# Patient Record
Sex: FEMALE | Race: OTHER | Employment: FULL TIME | URBAN - METROPOLITAN AREA
[De-identification: names, ages, dates, MRNs, and addresses within clinical notes are randomized per-mention and may not be internally consistent; named-entity substitution may affect disease eponyms.]

---

## 2017-07-03 ENCOUNTER — OFFICE VISIT (OUTPATIENT)
Dept: FAMILY MEDICINE CLINIC | Facility: CLINIC | Age: 52
End: 2017-07-03

## 2017-07-03 VITALS
BODY MASS INDEX: 32.02 KG/M2 | HEART RATE: 74 BPM | DIASTOLIC BLOOD PRESSURE: 82 MMHG | WEIGHT: 174 LBS | HEIGHT: 62 IN | SYSTOLIC BLOOD PRESSURE: 115 MMHG | TEMPERATURE: 98 F

## 2017-07-03 DIAGNOSIS — N76.0 ACUTE VAGINITIS: Primary | ICD-10-CM

## 2017-07-03 DIAGNOSIS — Z12.39 BREAST CANCER SCREENING: ICD-10-CM

## 2017-07-03 PROCEDURE — 99212 OFFICE O/P EST SF 10 MIN: CPT | Performed by: FAMILY MEDICINE

## 2017-07-03 PROCEDURE — 99214 OFFICE O/P EST MOD 30 MIN: CPT | Performed by: FAMILY MEDICINE

## 2017-07-03 RX ORDER — FLUCONAZOLE 150 MG/1
TABLET ORAL
Refills: 1 | COMMUNITY
Start: 2017-06-22 | End: 2017-07-03 | Stop reason: ALTCHOICE

## 2017-07-03 RX ORDER — METRONIDAZOLE 250 MG/1
250 TABLET ORAL 3 TIMES DAILY
Qty: 21 TABLET | Refills: 0 | Status: SHIPPED | OUTPATIENT
Start: 2017-07-03 | End: 2017-07-10

## 2017-07-03 RX ORDER — METRONIDAZOLE 7.5 MG/G
GEL VAGINAL
Refills: 0 | COMMUNITY
Start: 2017-06-22 | End: 2017-07-03

## 2017-07-03 NOTE — PROGRESS NOTES
7/3/2017  10:34 AM    Fidelina Gaston is a 46year old female. Chief complaint(s): Patient presents with:  Vaginal Discharge: Patient c/o yellow vaginal discharge, irritation, and itchiness. HPI:     Fidelina Gaston primary complaint is regarding as above. total) by mouth 3 (three) times daily. Disp: 21 tablet Rfl: 0   Terconazole 0.4 % Vaginal Cream Place 1 applicator vaginally nightly. Disp: 45 g Rfl: 0   Omeprazole 40 MG Oral Capsule Delayed Release Take 1 capsule (40 mg total) by mouth daily.  Disp: 90 ca no tenderness. Genitourinary: Cervix exhibits no motion tenderness and no friability. Right adnexum displays no mass and no tenderness. Left adnexum displays no mass and no tenderness. Vaginal discharge (yellowish) found.    Lymphadenopathy:     She has n given include: Please, call our office with any questions or concerns. Notify Dr Purvi Rodriguez or the [address] [place] [address] if there is a deterioration or worsening of the medical condition. Also, inform the doctor with any new symptoms or medications' side effects.

## 2017-07-05 LAB
C TRACH DNA SPEC QL NAA+PROBE: NEGATIVE
GENITAL VAGINOSIS SCREEN: NEGATIVE
N GONORRHOEA DNA SPEC QL NAA+PROBE: NEGATIVE
TRICHOMONAS SCREEN: NEGATIVE

## 2017-12-01 ENCOUNTER — HOSPITAL ENCOUNTER (OUTPATIENT)
Age: 52
Discharge: HOME | End: 2017-12-01
Attending: EMERGENCY MEDICINE
Payer: COMMERCIAL

## 2017-12-01 ENCOUNTER — NURSE TRIAGE (OUTPATIENT)
Dept: OTHER | Age: 52
End: 2017-12-01

## 2017-12-01 VITALS
WEIGHT: 170 LBS | BODY MASS INDEX: 31 KG/M2 | TEMPERATURE: 98 F | HEART RATE: 75 BPM | DIASTOLIC BLOOD PRESSURE: 92 MMHG | RESPIRATION RATE: 20 BRPM | SYSTOLIC BLOOD PRESSURE: 144 MMHG | OXYGEN SATURATION: 98 %

## 2017-12-01 DIAGNOSIS — L72.3 SEBACEOUS CYST: Primary | ICD-10-CM

## 2017-12-01 PROCEDURE — 99213 OFFICE O/P EST LOW 20 MIN: CPT

## 2017-12-01 PROCEDURE — 87430 STREP A AG IA: CPT

## 2017-12-01 PROCEDURE — 99214 OFFICE O/P EST MOD 30 MIN: CPT

## 2017-12-01 RX ORDER — CLINDAMYCIN HYDROCHLORIDE 300 MG/1
300 CAPSULE ORAL 3 TIMES DAILY
Qty: 21 CAPSULE | Refills: 0 | Status: SHIPPED | OUTPATIENT
Start: 2017-12-01 | End: 2017-12-08

## 2017-12-01 NOTE — ED INITIAL ASSESSMENT (HPI)
Patient states she has a hard mass on the back of her head, Denies trauma or recent falls. Patient complains of headache, right ear pain, sore throat, patient took tylenol and advil with little relief.

## 2017-12-01 NOTE — TELEPHONE ENCOUNTER
Action Requested: Summary for Provider     []  Critical Lab, Recommendations Needed  [] Need Additional Advice  []   FYI    []   Need Orders  [] Need Medications Sent to Pharmacy  []  Other     SUMMARY: Received call from patient who reports she notice

## 2017-12-01 NOTE — ED PROVIDER NOTES
Patient Seen in: [facility] [place]    History   Patient presents with:  Sore Throat  Ear Problem Pain (neurosensory)  Headache (neurologic)    Stated Complaint: Rt ear pain, sore throat, headache, mass    HPI    The patient is distress  Head: Normocephalic, there is minimal focal swelling approximately 3 cm posterior to the right ear in the right occipital scalp area consistent with a sebaceous cyst.  There is no palpable fluctuance.   There is minimal redness to the skin overlyi

## 2017-12-04 ENCOUNTER — OFFICE VISIT (OUTPATIENT)
Dept: FAMILY MEDICINE CLINIC | Facility: CLINIC | Age: 52
End: 2017-12-04

## 2017-12-04 VITALS
TEMPERATURE: 99 F | RESPIRATION RATE: 16 BRPM | BODY MASS INDEX: 31.47 KG/M2 | DIASTOLIC BLOOD PRESSURE: 75 MMHG | HEIGHT: 62 IN | WEIGHT: 171 LBS | SYSTOLIC BLOOD PRESSURE: 126 MMHG | HEART RATE: 76 BPM

## 2017-12-04 DIAGNOSIS — R51.9 OCCIPITAL HEADACHE: Primary | ICD-10-CM

## 2017-12-04 DIAGNOSIS — B37.3 YEAST VAGINITIS: ICD-10-CM

## 2017-12-04 DIAGNOSIS — B00.9 HSV INFECTION: ICD-10-CM

## 2017-12-04 PROCEDURE — 99214 OFFICE O/P EST MOD 30 MIN: CPT | Performed by: FAMILY MEDICINE

## 2017-12-04 PROCEDURE — 99212 OFFICE O/P EST SF 10 MIN: CPT | Performed by: FAMILY MEDICINE

## 2017-12-04 RX ORDER — ACYCLOVIR 400 MG/1
400 TABLET ORAL 2 TIMES DAILY
Qty: 60 TABLET | Refills: 1 | Status: SHIPPED | OUTPATIENT
Start: 2017-12-04 | End: 2021-08-02

## 2017-12-04 RX ORDER — IBUPROFEN 600 MG/1
600 TABLET ORAL EVERY 6 HOURS PRN
Qty: 60 TABLET | Refills: 0 | Status: SHIPPED | OUTPATIENT
Start: 2017-12-04 | End: 2021-08-02

## 2017-12-04 NOTE — PROGRESS NOTES
12/4/2017  3:13 PM    Giuseppe Kaur is a 46year old female. Chief complaint(s): Patient presents with:  Urgent Care F/u: 12/1/17 for 2 sebaceious cyst to back of head. Patient states it reduced size since last week.  Pt c/o right ear and eye pain that had Vaginal Cream Place 1 applicator vaginally nightly. Disp: 45 g Rfl: 1   ibuprofen 600 MG Oral Tab Take 1 tablet (600 mg total) by mouth every 6 (six) hours as needed for Pain. Always take it with food.  Disp: 60 tablet Rfl: 0   Clindamycin HCl 300 MG Oral C adenopathy. Skin: No rash noted.      LABORATORY RESULTS:   No results found for: [facility]     Results for orders placed or performed during the hospital encounter of 12/01/17  -[facility] POCT RAPID STREP   Result Valu

## 2020-07-27 ENCOUNTER — TELEPHONE (OUTPATIENT)
Dept: INTERNAL MEDICINE CLINIC | Facility: CLINIC | Age: 55
End: 2020-07-27

## 2021-06-28 ENCOUNTER — TELEPHONE (OUTPATIENT)
Dept: OBGYN CLINIC | Facility: CLINIC | Age: 56
End: 2021-06-28

## 2021-06-28 NOTE — TELEPHONE ENCOUNTER
Patient name and  verified. Patient scheduled with [facility] today but patient prefers to see doctor not NP. Advised patient [facility] does not have any opening this week or next week due to vacation. Appointment made with [facility] for 2021.  Advised patient to b
Please reschedule patient's 11:50 am appt for today to another day, 20-30 min. New Patient complex problem visit. Patient to bring any imaging studies done elsewhere.
pulse oximetry

## 2021-08-02 ENCOUNTER — OFFICE VISIT (OUTPATIENT)
Dept: OBGYN CLINIC | Facility: CLINIC | Age: 56
End: 2021-08-02
Payer: MEDICAID

## 2021-08-02 VITALS
HEART RATE: 64 BPM | SYSTOLIC BLOOD PRESSURE: 135 MMHG | BODY MASS INDEX: 33 KG/M2 | DIASTOLIC BLOOD PRESSURE: 86 MMHG | WEIGHT: 179 LBS

## 2021-08-02 DIAGNOSIS — N94.12 DEEP DYSPAREUNIA: Primary | ICD-10-CM

## 2021-08-02 PROCEDURE — 3075F SYST BP GE 130 - 139MM HG: CPT | Performed by: OBSTETRICS & GYNECOLOGY

## 2021-08-02 PROCEDURE — 99242 OFF/OP CONSLTJ NEW/EST SF 20: CPT | Performed by: OBSTETRICS & GYNECOLOGY

## 2021-08-02 PROCEDURE — 3079F DIAST BP 80-89 MM HG: CPT | Performed by: OBSTETRICS & GYNECOLOGY

## 2021-08-02 NOTE — PROGRESS NOTES
HPI:    Patient ID: Fidelina Gaston is a 64year old year old female. HPI  New patient GYN consultation  Referred by Dr. Vicki Browne  64year-old  5 para 1-0-4-1 last menstrual period 2014.   Referred because of finding on pelvic ultrasoun support. No evidence of cystocele,  abnormal bladder neck mobility or evident urinary incontinence. Cervix- smooth, normal epithelium without lesions or discharge. No motion tenderness. Uterus- normal size, shape, and contour. Nontender. No masses.

## 2025-08-27 ENCOUNTER — OFFICE VISIT (OUTPATIENT)
Dept: PODIATRY CLINIC | Facility: CLINIC | Age: 60
End: 2025-08-27

## 2025-08-27 DIAGNOSIS — M72.2 PLANTAR FASCIITIS OF RIGHT FOOT: ICD-10-CM

## 2025-08-27 DIAGNOSIS — M79.2 NEURITIS: ICD-10-CM

## 2025-08-27 DIAGNOSIS — G89.29 CHRONIC PAIN OF RIGHT ANKLE: Primary | ICD-10-CM

## 2025-08-27 DIAGNOSIS — M25.571 CHRONIC PAIN OF RIGHT ANKLE: Primary | ICD-10-CM

## 2025-08-27 RX ORDER — CELECOXIB 100 MG/1
100 CAPSULE ORAL 2 TIMES DAILY WITH MEALS
COMMUNITY
Start: 2025-07-20

## 2025-08-27 RX ORDER — MELOXICAM 15 MG/1
15 TABLET ORAL DAILY
Qty: 30 TABLET | Refills: 1 | Status: SHIPPED | OUTPATIENT
Start: 2025-08-27

## 2025-08-27 RX ORDER — NAPROXEN 500 MG/1
500 TABLET ORAL 2 TIMES DAILY WITH MEALS
COMMUNITY
Start: 2025-06-14

## 2025-08-27 RX ORDER — KETOROLAC TROMETHAMINE 10 MG/1
10 TABLET, FILM COATED ORAL EVERY 6 HOURS PRN
COMMUNITY
Start: 2025-07-12

## 2025-08-27 RX ORDER — LISINOPRIL 20 MG/1
20 TABLET ORAL EVERY MORNING
COMMUNITY

## 2025-08-27 RX ORDER — METHYLPREDNISOLONE 4 MG/1
4 TABLET ORAL DAILY
COMMUNITY
Start: 2025-08-06 | End: 2026-08-01